# Patient Record
Sex: FEMALE | Race: WHITE | Employment: STUDENT | URBAN - NONMETROPOLITAN AREA
[De-identification: names, ages, dates, MRNs, and addresses within clinical notes are randomized per-mention and may not be internally consistent; named-entity substitution may affect disease eponyms.]

---
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
## 2025-06-07 ENCOUNTER — HOSPITAL ENCOUNTER (EMERGENCY)
Age: 20
Discharge: HOME | End: 2025-06-07
Payer: COMMERCIAL

## 2025-06-07 VITALS
RESPIRATION RATE: 16 BRPM | BODY MASS INDEX: 21.34 KG/M2 | HEIGHT: 64 IN | HEART RATE: 62 BPM | DIASTOLIC BLOOD PRESSURE: 70 MMHG | WEIGHT: 125 LBS | SYSTOLIC BLOOD PRESSURE: 108 MMHG | TEMPERATURE: 97.6 F | OXYGEN SATURATION: 100 %

## 2025-06-07 DIAGNOSIS — R10.13 EPIGASTRIC PAIN: Primary | ICD-10-CM

## 2025-06-07 PROCEDURE — 99213 OFFICE O/P EST LOW 20 MIN: CPT

## 2025-06-07 RX ORDER — NORETHINDRONE ACETATE AND ETHINYL ESTRADIOL 1MG-20(21)
1 KIT ORAL DAILY
COMMUNITY

## 2025-06-07 RX ORDER — SIMETHICONE 80 MG
80 TABLET,CHEWABLE ORAL EVERY 6 HOURS PRN
COMMUNITY

## 2025-06-07 ASSESSMENT — PAIN - FUNCTIONAL ASSESSMENT
PAIN_FUNCTIONAL_ASSESSMENT: 0-10
PAIN_FUNCTIONAL_ASSESSMENT: ACTIVITIES ARE NOT PREVENTED

## 2025-06-07 ASSESSMENT — PAIN DESCRIPTION - DESCRIPTORS: DESCRIPTORS: DISCOMFORT

## 2025-06-07 ASSESSMENT — ENCOUNTER SYMPTOMS
VOMITING: 0
DIARRHEA: 0
NAUSEA: 0
CONSTIPATION: 0
ABDOMINAL PAIN: 1

## 2025-06-07 ASSESSMENT — PAIN DESCRIPTION - LOCATION: LOCATION: ABDOMEN

## 2025-06-07 ASSESSMENT — PAIN SCALES - GENERAL: PAINLEVEL_OUTOF10: 7

## 2025-06-07 NOTE — DISCHARGE INSTRUCTIONS
Banks diet and increase as tolerated  Increase water intake, frequent hand washing.  Tylenol / Ibuprofen as needed for fever and or pain.  To ER with new or worsening symptoms as discussed.  Follow up with PCP within the week.

## 2025-06-07 NOTE — ED PROVIDER NOTES
[facility] URGENT CARE  Urgent Care Encounter       CHIEF COMPLAINT       Chief Complaint   Patient presents with    Abdominal Pain     \"Feels like gas pain\"       Nurses Notes reviewed and I agree except as noted in the HPI.  HISTORY OF PRESENT ILLNESS   Reji Dumont is a 19 y.o. female who presents with concerns of stomach pain that started this morning. States \"it feels like gas pain\". Patient reports use of GasX that is now feeling that is helping her symptoms. Patient denies any chance of pregnancy. Patient reports symptoms caused her to leave work early today.    HPI    REVIEW OF SYSTEMS     Review of Systems   Constitutional:  Negative for fatigue and fever.   Gastrointestinal:  Positive for abdominal pain (\"improving\"). Negative for constipation (last bowel movement yesterday, \"normal\"), diarrhea, nausea and vomiting.   Genitourinary: Negative.    All other systems reviewed and are negative.      PAST MEDICAL HISTORY   History reviewed. No pertinent past medical history.    SURGICALHISTORY     Patient  has no past surgical history on file.    CURRENT MEDICATIONS       Previous Medications    NORETHINDRONE-ETHINYL ESTRADIOL (JUNEL FE 1/20) 1-20 MG-MCG PER TABLET    Take 1 tablet by mouth daily    SIMETHICONE (MYLICON) 80 MG CHEWABLE TABLET    Take 1 tablet by mouth every 6 hours as needed for Flatulence       ALLERGIES     Patient is has no known allergies.    Patients   There is no immunization history on file for this patient.    FAMILY HISTORY     Patient's family history includes Asthma in her mother; Cancer in her maternal grandmother; Diabetes in her maternal grandmother; High Blood Pressure in her father, maternal grandmother, and mother.    SOCIAL HISTORY     Patient  reports that she is a non-smoker but has been exposed to tobacco smoke. She has never used smokeless tobacco. She reports that she does not drink alcohol and does not use drugs.    PHYSICAL EXAM     ED TRIAGE VITALS  BP: 108/70,